# Patient Record
Sex: MALE | Race: WHITE | NOT HISPANIC OR LATINO
[De-identification: names, ages, dates, MRNs, and addresses within clinical notes are randomized per-mention and may not be internally consistent; named-entity substitution may affect disease eponyms.]

---

## 2019-08-01 PROBLEM — Z00.00 ENCOUNTER FOR PREVENTIVE HEALTH EXAMINATION: Status: ACTIVE | Noted: 2019-08-01

## 2019-09-03 ENCOUNTER — APPOINTMENT (OUTPATIENT)
Dept: CARDIOLOGY | Facility: CLINIC | Age: 64
End: 2019-09-03

## 2024-04-03 ENCOUNTER — LABORATORY RESULT (OUTPATIENT)
Age: 69
End: 2024-04-03

## 2024-04-05 NOTE — H&P ADULT - ASSESSMENT
This is a 68 y/o male with Hx of HTN ,HLD, c/o chest pain and SOB on exertion with an abnormal CTA for 50% LAD    Patient presents now for an angiogram with possible intervention.  - risk/ benefits discussed  - informed consent obtained    ASA-  Creat-  GFR-  Bleeding risk-  [facility]-  This is a 68 y/o male with Hx of HTN ,HLD, CAD c/o chest pain and SOB on exertion and significant family history with an abnormal CTA for 50% LAD in 2018. Pt scheduled for [facility] to further evaluation.     ASA- II  Creat- 1.35  GFR- 56  Bleeding risk- 1.9%  Ron-  3 pts

## 2024-04-05 NOTE — H&P ADULT - NSICDXFAMILYHX_GEN_ALL_CORE_FT
FAMILY HISTORY:  Father  Still living? No  FH: CAD (coronary artery disease), Age at diagnosis: Age Unknown    Mother  Still living? Unknown  Family history of CABG, Age at diagnosis: Age Unknown

## 2024-04-05 NOTE — H&P ADULT - HISTORY OF PRESENT ILLNESS
This is a 68 y/o male with Hx of HTN ,HLD, c/o chest pain and SOB on exertion with an abnormal CTA for 50% LAD This is a 68 y/o male with Hx of HTN ,HLD, CAD c/o chest pain and SOB on exertion and significant family history with an abnormal CTA for 50% LAD in 2018. Pt scheduled for [facility] [place] to further evaluation.

## 2024-04-05 NOTE — H&P ADULT - PROBLEM SELECTOR PLAN 1
a/w intermittent chest pain on exertion    [facility] with possible PCI  GEE prehydration NS 250cc bolus x 1  -Consent obtained for cardiac catheterization w/ coronary angiogram, possible sedation and/or analgesia and possible stent placement. Pt is competent, has capacity, and understands risks and benefits of procedure. Risks and benefits discussed. Risk discussed included, but not limited to MI, stroke, mortality, major bleeding, arrythmia, or infection. All questions answered

## 2024-04-08 ENCOUNTER — OUTPATIENT (OUTPATIENT)
Dept: OUTPATIENT SERVICES | Facility: HOSPITAL | Age: 69
LOS: 1 days | Discharge: HOME | End: 2024-04-08
Payer: COMMERCIAL

## 2024-04-08 ENCOUNTER — TRANSCRIPTION ENCOUNTER (OUTPATIENT)
Age: 69
End: 2024-04-08

## 2024-04-08 VITALS
HEART RATE: 73 BPM | DIASTOLIC BLOOD PRESSURE: 62 MMHG | OXYGEN SATURATION: 100 % | SYSTOLIC BLOOD PRESSURE: 154 MMHG | RESPIRATION RATE: 16 BRPM

## 2024-04-08 VITALS — WEIGHT: 218.04 LBS | HEIGHT: 69 IN

## 2024-04-08 DIAGNOSIS — Z96.651 PRESENCE OF RIGHT ARTIFICIAL KNEE JOINT: Chronic | ICD-10-CM

## 2024-04-08 DIAGNOSIS — Z96.652 PRESENCE OF LEFT ARTIFICIAL KNEE JOINT: Chronic | ICD-10-CM

## 2024-04-08 DIAGNOSIS — I25.10 ATHEROSCLEROTIC HEART DISEASE OF NATIVE CORONARY ARTERY WITHOUT ANGINA PECTORIS: ICD-10-CM

## 2024-04-08 LAB
ANION GAP SERPL CALC-SCNC: 14 MMOL/L
BASOPHILS # BLD AUTO: 0.04 K/UL
BASOPHILS NFR BLD AUTO: 0.5 %
BLD GP AB SCN SERPL QL: SIGNIFICANT CHANGE UP
BUN SERPL-MCNC: 30 MG/DL
CALCIUM SERPL-MCNC: 9.6 MG/DL
CHLORIDE SERPL-SCNC: 104 MMOL/L
CO2 SERPL-SCNC: 24 MMOL/L
CREAT SERPL-MCNC: 1.35 MG/DL
EOSINOPHIL # BLD AUTO: 0.23 K/UL
EOSINOPHIL NFR BLD AUTO: 3 %
GLUCOSE SERPL-MCNC: 88 MG/DL
HCT VFR BLD CALC: 37.1 %
HGB BLD-MCNC: 11.2 G/DL
IMM GRANULOCYTES NFR BLD AUTO: 0.8 %
LYMPHOCYTES # BLD AUTO: 1.42 K/UL
LYMPHOCYTES NFR BLD AUTO: 18.6 %
MAN DIFF?: NORMAL
MCHC RBC-ENTMCNC: 20.3 PG
MCHC RBC-ENTMCNC: 30.2 GM/DL
MCV RBC AUTO: 67.2 FL
MONOCYTES # BLD AUTO: 0.61 K/UL
MONOCYTES NFR BLD AUTO: 8 %
NEUTROPHILS # BLD AUTO: 5.29 K/UL
NEUTROPHILS NFR BLD AUTO: 69.1 %
PLATELET # BLD AUTO: 319 K/UL
POTASSIUM SERPL-SCNC: 4.9 MMOL/L
RBC # BLD: 5.52 M/UL
RBC # FLD: 17.4 %
SODIUM SERPL-SCNC: 142 MMOL/L
WBC # FLD AUTO: 7.65 K/UL

## 2024-04-08 PROCEDURE — 99152 MOD SED SAME PHYS/QHP 5/>YRS: CPT

## 2024-04-08 PROCEDURE — 93458 L HRT ARTERY/VENTRICLE ANGIO: CPT | Mod: 26,59

## 2024-04-08 PROCEDURE — C1887: CPT

## 2024-04-08 PROCEDURE — 86850 RBC ANTIBODY SCREEN: CPT

## 2024-04-08 PROCEDURE — 86901 BLOOD TYPING SEROLOGIC RH(D): CPT

## 2024-04-08 PROCEDURE — C1761: CPT

## 2024-04-08 PROCEDURE — 36415 COLL VENOUS BLD VENIPUNCTURE: CPT

## 2024-04-08 PROCEDURE — C1874: CPT

## 2024-04-08 PROCEDURE — 93010 ELECTROCARDIOGRAM REPORT: CPT

## 2024-04-08 PROCEDURE — C1894: CPT

## 2024-04-08 PROCEDURE — 92978 ENDOLUMINL IVUS OCT C 1ST: CPT | Mod: LD

## 2024-04-08 PROCEDURE — 86900 BLOOD TYPING SEROLOGIC ABO: CPT

## 2024-04-08 PROCEDURE — C1753: CPT

## 2024-04-08 PROCEDURE — C1769: CPT

## 2024-04-08 PROCEDURE — 92972 PERQ TRLUML CORONRY LITHOTRP: CPT

## 2024-04-08 PROCEDURE — 92928 PRQ TCAT PLMT NTRAC ST 1 LES: CPT | Mod: LD

## 2024-04-08 PROCEDURE — 92978 ENDOLUMINL IVUS OCT C 1ST: CPT | Mod: 26,LD

## 2024-04-08 PROCEDURE — 93005 ELECTROCARDIOGRAM TRACING: CPT

## 2024-04-08 PROCEDURE — 93458 L HRT ARTERY/VENTRICLE ANGIO: CPT | Mod: 59

## 2024-04-08 PROCEDURE — C1725: CPT

## 2024-04-08 RX ORDER — EZETIMIBE 10 MG/1
1 TABLET ORAL
Refills: 0 | DISCHARGE

## 2024-04-08 RX ORDER — ASPIRIN/CALCIUM CARB/MAGNESIUM 324 MG
0 TABLET ORAL
Refills: 0 | DISCHARGE

## 2024-04-08 RX ORDER — SODIUM CHLORIDE 9 MG/ML
250 INJECTION INTRAMUSCULAR; INTRAVENOUS; SUBCUTANEOUS ONCE
Refills: 0 | Status: DISCONTINUED | OUTPATIENT
Start: 2024-04-08 | End: 2024-04-08

## 2024-04-08 RX ORDER — CLOPIDOGREL BISULFATE 75 MG/1
1 TABLET, FILM COATED ORAL
Qty: 30 | Refills: 11
Start: 2024-04-08 | End: 2025-04-02

## 2024-04-08 RX ORDER — SODIUM CHLORIDE 9 MG/ML
1000 INJECTION INTRAMUSCULAR; INTRAVENOUS; SUBCUTANEOUS
Refills: 0 | Status: DISCONTINUED | OUTPATIENT
Start: 2024-04-08 | End: 2024-04-08

## 2024-04-08 RX ADMIN — SODIUM CHLORIDE 200 MILLILITER(S): 9 INJECTION INTRAMUSCULAR; INTRAVENOUS; SUBCUTANEOUS at 14:15

## 2024-04-08 NOTE — ASU DISCHARGE PLAN (ADULT/PEDIATRIC) - CARE PROVIDER_API CALL
Roscoe Koenig  Cardiovascular Disease  [address]  [place] [address]  Phone: [phone]  Fax: [phone]  Follow Up Time:

## 2024-04-08 NOTE — PACU DISCHARGE NOTE - COMMENTS
Pt. DC S/P LHC- Pt. denies CP and SOB- Right Wrist Pressure dressing dry and intact- PPT OOB with Steady Gait.  Pt. verbalizes understanding of DC instructions and F/U- Transported to main lobby by RN- Safety maintained

## 2024-04-08 NOTE — PROGRESS NOTE ADULT - SUBJECTIVE AND OBJECTIVE BOX
Department of Cardiology                                                               Division of Interventional Cardiology                                                               [facility] /[address] [address]                                                                                 [phone]           Post Procedure Note    HPI:  This is a 68 y/o male with Hx of HTN ,HLD, CAD c/o chest pain and SOB on exertion and significant family history with an abnormal CTA for 50% LAD in 2018. Pt scheduled for [facility] to further evaluation.  (05 Apr 2024 11:56)    s/p LHC : isidra x 2 to LAD   Pt denies chest pain/SOB/palpitations post cath.    Vital Signs  Vital Signs Last 24 Hrs  T(C): 36.7 (08 Apr 2024 10:05), Max: 36.7 (08 Apr 2024 10:05)  T(F): 98 (08 Apr 2024 10:05), Max: 98 (08 Apr 2024 10:05)  HR: 57 (08 Apr 2024 12:30) (57 - 63)  BP: 128/64 (08 Apr 2024 12:30) (128/64 - 161/90)  BP(mean): --  RR: 16 (08 Apr 2024 12:30) (16 - 16)  SpO2: 100% (08 Apr 2024 12:30) (100% - 100%)    Parameters below as of 08 Apr 2024 12:30  Patient On (Oxygen Delivery Method): room air          Labs:    MEDICATIONS  (STANDING):  sodium chloride 0.9% Bolus 250 milliLiter(s) IV Bolus once  sodium chloride 0.9%. 1000 milliLiter(s) (200 mL/Hr) IV Continuous <Continuous>      PHYSICAL EXAM  GENERAL: NAD, AAOx3  CHEST/LUNG: Clear to auscultation bilaterally; No wheeze  HEART: s1 s2 Regular rate and rhythm; No murmurs, rubs, or gallops  ABDOMEN: Soft, Nontender, Nondistended; Bowel sounds present X 4 quadrants   EXTREMITIES:  2+ Peripheral Pulses, No clubbing, cyanosis, or edema  Psych: normal affect and behavior, calm and cooperative   PROCEDURE SITE:  RRA band removed two hours post placement ,Site is without hematoma or bleeding. Sensation and LYNDSEY intact. Distal pulses palpable 2+, capillary refill < 2 seconds. Patient denies pain, numbness, tingling, CP or SOB.      EKG : 4/8/24  EKG SB rate 56    PROCEDURE RESULTS full report to follow     ASSESSMENT : HPI:  This is a 68 y/o male with Hx of HTN ,HLD, CAD c/o chest pain and SOB on exertion and significant family history with an abnormal CTA for 50% LAD in 2018. Pt scheduled for [facility] to further evaluation.  (05 Apr 2024 11:56)    PLAN:  #CAD  - s/p DESx 2 to LAD  -IV hydration per GEE protocol   -VS, lab, diet and activity per PCI post orders  -continue ASA/b-blocker/statin  -Begin Plavix 75mg daily   -f/u EKG in AM, AM Labs  -Discussed therapeutic lifestyle changes to reduce risk factors such as following a cardiac diet, weight loss, maintaining a healthy weight, exercise, smoking cessation, medication compliance, and regular follow-up  with PCP/Cardioloigst  - stent card given to patient/family -----  -Qualified for cardiac rehab. Patient educated on cardiac rehab. Information sheet provided and left with patient  --Post cath instructions reviewed, post sedation instructions reviewed,  patient verbalizes and understands instructions  - Patient to be discharged home, recommend following up with cardiologist in 2 weeks  -plan discussed with patient, RN and Dr Rios

## 2024-04-08 NOTE — CHART NOTE - NSCHARTNOTEFT_GEN_A_CORE
ISABELLA PETERSON  [id number]    Pt referred to cardiac rehab post PCI.   Education benefits of cardiac rehab provided to patient    Cardiac rehab deffered at this time.   Pt has refused at this time, information sheet with [facility] website provided.       list of locations provided to patient to take home and discuss further with primary cardiologist.             04-08-24 @ 15:12

## 2024-04-11 DIAGNOSIS — I25.118 ATHEROSCLEROTIC HEART DISEASE OF NATIVE CORONARY ARTERY WITH OTHER FORMS OF ANGINA PECTORIS: ICD-10-CM

## 2024-04-11 NOTE — POST DISCHARGE NOTE - DETAILS:
Post procedure phone call completed; patient understood all discharge paperwork. No questions regarding medications or pain management. MD follow up appointment made. Patient was able to rest when they were discharged. Patient will recommend [facility], no complaints of hospital stay, satisfied with care. Instructed patient to contact provider with any further questions or concerns.     patient called back

## 2024-05-16 NOTE — POST DISCHARGE NOTE - DETAILS:
30 day post procedure phone call completed;  No questions regarding medications or pain management. Continues to follow up with MD. Patient will continue to recommend [facility], no complaints of hospital stay, satisfied with care. Instructed patient to contact provider with any further questions or concerns.

## 2024-06-20 ENCOUNTER — TRANSCRIPTION ENCOUNTER (OUTPATIENT)
Age: 69
End: 2024-06-20